# Patient Record
Sex: FEMALE | Race: WHITE | NOT HISPANIC OR LATINO | Employment: FULL TIME | URBAN - METROPOLITAN AREA
[De-identification: names, ages, dates, MRNs, and addresses within clinical notes are randomized per-mention and may not be internally consistent; named-entity substitution may affect disease eponyms.]

---

## 2024-03-23 ENCOUNTER — APPOINTMENT (EMERGENCY)
Dept: RADIOLOGY | Facility: HOSPITAL | Age: 59
End: 2024-03-23
Payer: COMMERCIAL

## 2024-03-23 ENCOUNTER — HOSPITAL ENCOUNTER (EMERGENCY)
Facility: HOSPITAL | Age: 59
Discharge: HOME | End: 2024-03-23
Attending: FAMILY MEDICINE
Payer: COMMERCIAL

## 2024-03-23 VITALS
DIASTOLIC BLOOD PRESSURE: 58 MMHG | HEART RATE: 75 BPM | RESPIRATION RATE: 18 BRPM | OXYGEN SATURATION: 92 % | WEIGHT: 208 LBS | TEMPERATURE: 98.1 F | SYSTOLIC BLOOD PRESSURE: 123 MMHG

## 2024-03-23 DIAGNOSIS — J10.1 INFLUENZA B: ICD-10-CM

## 2024-03-23 DIAGNOSIS — R05.9 COUGH: ICD-10-CM

## 2024-03-23 DIAGNOSIS — J18.9 PNEUMONIA: Primary | ICD-10-CM

## 2024-03-23 LAB
ANION GAP SERPL CALCULATED.3IONS-SCNC: 11 MMOL/L (ref 4–13)
BASOPHILS # BLD AUTO: 0.01 THOUSANDS/ÂΜL (ref 0–0.1)
BASOPHILS NFR BLD AUTO: 0 % (ref 0–1)
BNP SERPL-MCNC: 64 PG/ML (ref 0–100)
BUN SERPL-MCNC: 14 MG/DL (ref 5–25)
CALCIUM SERPL-MCNC: 10.1 MG/DL (ref 8.4–10.2)
CHLORIDE SERPL-SCNC: 98 MMOL/L (ref 96–108)
CO2 SERPL-SCNC: 24 MMOL/L (ref 21–32)
CREAT SERPL-MCNC: 0.94 MG/DL (ref 0.6–1.3)
EOSINOPHIL # BLD AUTO: 0 THOUSAND/ÂΜL (ref 0–0.61)
EOSINOPHIL NFR BLD AUTO: 0 % (ref 0–6)
ERYTHROCYTE [DISTWIDTH] IN BLOOD BY AUTOMATED COUNT: 12.1 % (ref 11.6–15.1)
FLUAV RNA RESP QL NAA+PROBE: NEGATIVE
FLUBV RNA RESP QL NAA+PROBE: POSITIVE
GFR SERPL CREATININE-BSD FRML MDRD: 65 ML/MIN/1.73SQ M
GLUCOSE SERPL-MCNC: 131 MG/DL (ref 65–140)
HCT VFR BLD AUTO: 42.6 % (ref 34.8–46.1)
HGB BLD-MCNC: 14 G/DL (ref 11.5–15.4)
IMM GRANULOCYTES # BLD AUTO: 0.02 THOUSAND/UL (ref 0–0.2)
IMM GRANULOCYTES NFR BLD AUTO: 0 % (ref 0–2)
LYMPHOCYTES # BLD AUTO: 1.16 THOUSANDS/ÂΜL (ref 0.6–4.47)
LYMPHOCYTES NFR BLD AUTO: 12 % (ref 14–44)
MCH RBC QN AUTO: 30.3 PG (ref 26.8–34.3)
MCHC RBC AUTO-ENTMCNC: 32.9 G/DL (ref 31.4–37.4)
MCV RBC AUTO: 92 FL (ref 82–98)
MONOCYTES # BLD AUTO: 0.54 THOUSAND/ÂΜL (ref 0.17–1.22)
MONOCYTES NFR BLD AUTO: 6 % (ref 4–12)
NEUTROPHILS # BLD AUTO: 7.91 THOUSANDS/ÂΜL (ref 1.85–7.62)
NEUTS SEG NFR BLD AUTO: 82 % (ref 43–75)
NRBC BLD AUTO-RTO: 0 /100 WBCS
PLATELET # BLD AUTO: 268 THOUSANDS/UL (ref 149–390)
PMV BLD AUTO: 10 FL (ref 8.9–12.7)
POTASSIUM SERPL-SCNC: 3.9 MMOL/L (ref 3.5–5.3)
RBC # BLD AUTO: 4.62 MILLION/UL (ref 3.81–5.12)
RSV RNA RESP QL NAA+PROBE: NEGATIVE
SARS-COV-2 RNA RESP QL NAA+PROBE: NEGATIVE
SODIUM SERPL-SCNC: 133 MMOL/L (ref 135–147)
WBC # BLD AUTO: 9.64 THOUSAND/UL (ref 4.31–10.16)

## 2024-03-23 PROCEDURE — 71045 X-RAY EXAM CHEST 1 VIEW: CPT

## 2024-03-23 PROCEDURE — 96375 TX/PRO/DX INJ NEW DRUG ADDON: CPT

## 2024-03-23 PROCEDURE — 36415 COLL VENOUS BLD VENIPUNCTURE: CPT | Performed by: FAMILY MEDICINE

## 2024-03-23 PROCEDURE — 99283 EMERGENCY DEPT VISIT LOW MDM: CPT

## 2024-03-23 PROCEDURE — 94640 AIRWAY INHALATION TREATMENT: CPT

## 2024-03-23 PROCEDURE — 99285 EMERGENCY DEPT VISIT HI MDM: CPT | Performed by: FAMILY MEDICINE

## 2024-03-23 PROCEDURE — 85025 COMPLETE CBC W/AUTO DIFF WBC: CPT | Performed by: FAMILY MEDICINE

## 2024-03-23 PROCEDURE — 83880 ASSAY OF NATRIURETIC PEPTIDE: CPT | Performed by: FAMILY MEDICINE

## 2024-03-23 PROCEDURE — 96365 THER/PROPH/DIAG IV INF INIT: CPT

## 2024-03-23 PROCEDURE — 80048 BASIC METABOLIC PNL TOTAL CA: CPT | Performed by: FAMILY MEDICINE

## 2024-03-23 PROCEDURE — 0241U HB NFCT DS VIR RESP RNA 4 TRGT: CPT | Performed by: FAMILY MEDICINE

## 2024-03-23 RX ORDER — CEFTRIAXONE 1 G/50ML
1000 INJECTION, SOLUTION INTRAVENOUS ONCE
Status: COMPLETED | OUTPATIENT
Start: 2024-03-23 | End: 2024-03-23

## 2024-03-23 RX ORDER — BENZONATATE 100 MG/1
100 CAPSULE ORAL EVERY 8 HOURS
Qty: 21 CAPSULE | Refills: 0 | Status: SHIPPED | OUTPATIENT
Start: 2024-03-23

## 2024-03-23 RX ORDER — METHYLPREDNISOLONE 4 MG/1
TABLET ORAL
Qty: 21 TABLET | Refills: 0 | Status: SHIPPED | OUTPATIENT
Start: 2024-03-23

## 2024-03-23 RX ORDER — AZITHROMYCIN 250 MG/1
TABLET, FILM COATED ORAL
Qty: 6 TABLET | Refills: 0 | Status: SHIPPED | OUTPATIENT
Start: 2024-03-23 | End: 2024-03-27

## 2024-03-23 RX ORDER — ALBUTEROL SULFATE 2.5 MG/3ML
2.5 SOLUTION RESPIRATORY (INHALATION) ONCE
Status: COMPLETED | OUTPATIENT
Start: 2024-03-23 | End: 2024-03-23

## 2024-03-23 RX ORDER — METHYLPREDNISOLONE SODIUM SUCCINATE 125 MG/2ML
125 INJECTION, POWDER, LYOPHILIZED, FOR SOLUTION INTRAMUSCULAR; INTRAVENOUS ONCE
Status: COMPLETED | OUTPATIENT
Start: 2024-03-23 | End: 2024-03-23

## 2024-03-23 RX ADMIN — METHYLPREDNISOLONE SODIUM SUCCINATE 125 MG: 125 INJECTION, POWDER, FOR SOLUTION INTRAMUSCULAR; INTRAVENOUS at 08:28

## 2024-03-23 RX ADMIN — ALBUTEROL SULFATE 2.5 MG: 2.5 SOLUTION RESPIRATORY (INHALATION) at 08:29

## 2024-03-23 RX ADMIN — SODIUM CHLORIDE 1000 ML: 0.9 INJECTION, SOLUTION INTRAVENOUS at 08:20

## 2024-03-23 RX ADMIN — CEFTRIAXONE 1000 MG: 1 INJECTION, SOLUTION INTRAVENOUS at 08:28

## 2024-03-23 NOTE — Clinical Note
Radha Guillen was seen and treated in our emergency department on 3/23/2024.                Diagnosis:     Radha  may return to work on return date.    She may return on this date: 03/26/2024         If you have any questions or concerns, please don't hesitate to call.      Bjorn Rubalcava MD    ______________________________           _______________          _______________  Hospital Representative                              Date                                Time

## 2024-03-23 NOTE — ED PROVIDER NOTES
History  Chief Complaint   Patient presents with    Cough     HPI  This is 59-year-old female with a history of hypertension presented with complaint of productive cough that started on Tuesday.  Patient denies any fever.  States had a COVID negative test at home.  Patient states that they are here visiting and was having some shortness of breath yesterday which prompted this ED visit.  Patient oxygen saturation is 96 to 97% on room air.  Heart rate is 80.  Patient is nontoxic-appearing afebrile sitting comfortably in bed no conversation dyspnea noted.  Otherwise stable.  Patient denying any chest pain abdominal pain nausea vomiting or diarrhea.  Eating drinking at baseline.  Prior to Admission Medications   Prescriptions Last Dose Informant Patient Reported? Taking?   olmesartan 20 mg TABS 20 mg, hydroCHLOROthiazide 12.5 mg TABS 12.5 mg   Yes No   Sig: Take by mouth daily      Facility-Administered Medications: None       Past Medical History:   Diagnosis Date    Hypertension        History reviewed. No pertinent surgical history.    History reviewed. No pertinent family history.  I have reviewed and agree with the history as documented.    E-Cigarette/Vaping    E-Cigarette Use Never User      E-Cigarette/Vaping Substances     Social History     Tobacco Use    Smoking status: Never    Smokeless tobacco: Never   Vaping Use    Vaping status: Never Used   Substance Use Topics    Drug use: Never       Review of Systems   Constitutional:  Negative for chills and fever.   HENT:  Positive for congestion and sore throat. Negative for rhinorrhea.    Eyes:  Negative for visual disturbance.   Respiratory:  Positive for cough and shortness of breath.    Cardiovascular:  Negative for chest pain and leg swelling.   Gastrointestinal:  Negative for abdominal pain, diarrhea, nausea and vomiting.   Genitourinary:  Negative for dysuria.   Musculoskeletal:  Negative for back pain and myalgias.   Skin:  Negative for rash.    Neurological:  Negative for dizziness and headaches.   Psychiatric/Behavioral:  Negative for confusion.    All other systems reviewed and are negative.      Physical Exam  Physical Exam  Vitals and nursing note reviewed.   Constitutional:       Appearance: She is well-developed.   HENT:      Head: Normocephalic and atraumatic.      Right Ear: External ear normal.      Left Ear: External ear normal.      Nose: Nose normal.      Mouth/Throat:      Mouth: Mucous membranes are moist.      Pharynx: No oropharyngeal exudate.   Eyes:      General: No scleral icterus.        Right eye: No discharge.         Left eye: No discharge.      Conjunctiva/sclera: Conjunctivae normal.      Pupils: Pupils are equal, round, and reactive to light.   Cardiovascular:      Rate and Rhythm: Normal rate and regular rhythm.      Pulses: Normal pulses.      Heart sounds: Normal heart sounds.   Pulmonary:      Effort: Pulmonary effort is normal. No respiratory distress.      Breath sounds: Rhonchi present. No wheezing.   Abdominal:      General: Bowel sounds are normal.      Palpations: Abdomen is soft.   Musculoskeletal:         General: Normal range of motion.      Cervical back: Normal range of motion and neck supple.   Lymphadenopathy:      Cervical: No cervical adenopathy.   Skin:     General: Skin is warm and dry.      Capillary Refill: Capillary refill takes less than 2 seconds.   Neurological:      General: No focal deficit present.      Mental Status: She is alert and oriented to person, place, and time.   Psychiatric:         Mood and Affect: Mood normal.         Behavior: Behavior normal.         Vital Signs  ED Triage Vitals   Temperature Pulse Respirations Blood Pressure SpO2   03/23/24 0805 03/23/24 0807 03/23/24 0807 03/23/24 0807 03/23/24 0807   98.1 °F (36.7 °C) 80 18 138/66 96 %      Temp Source Heart Rate Source Patient Position - Orthostatic VS BP Location FiO2 (%)   03/23/24 0805 03/23/24 0807 03/23/24 0815 03/23/24 0815  --   Temporal Monitor Sitting Left arm       Pain Score       03/23/24 0807       8           Vitals:    03/23/24 0807 03/23/24 0815 03/23/24 0830 03/23/24 0900   BP: 138/66 132/71 111/57 123/58   Pulse: 80 75 75 75   Patient Position - Orthostatic VS:  Sitting Sitting Sitting         Visual Acuity      ED Medications  Medications   azithromycin (ZITHROMAX) 500 mg in sodium chloride 0.9 % 250 mL IVPB (500 mg Intravenous Not Given 3/23/24 0900)   sodium chloride 0.9 % bolus 1,000 mL (0 mL Intravenous Stopped 3/23/24 0901)   methylPREDNISolone sodium succinate (Solu-MEDROL) injection 125 mg (125 mg Intravenous Given 3/23/24 0828)   albuterol inhalation solution 2.5 mg (2.5 mg Nebulization Given 3/23/24 0829)   cefTRIAXone (ROCEPHIN) IVPB (premix in dextrose) 1,000 mg 50 mL (0 mg Intravenous Stopped 3/23/24 0858)       Diagnostic Studies  Results Reviewed       Procedure Component Value Units Date/Time    FLU/RSV/COVID - if FLU/RSV clinically relevant [[id number]]  (Abnormal) Collected: 03/23/24 0817    Lab Status: Final result Specimen: Nares from Nose Updated: 03/23/24 0908     SARS-CoV-2 Negative     INFLUENZA A PCR Negative     INFLUENZA B PCR Positive     RSV PCR Negative    Narrative:      FOR PEDIATRIC PATIENTS - copy/paste COVID Guidelines URL to browser: [email or web address]    SARS-CoV-2 assay is a Nucleic Acid Amplification assay intended for the  qualitative detection of nucleic acid from SARS-CoV-2 in nasopharyngeal  swabs. Results are for the presumptive identification of SARS-CoV-2 RNA.    Positive results are indicative of infection with SARS-CoV-2, the virus  causing COVID-19, but do not rule out bacterial infection or co-infection  with other viruses. Laboratories within the [place] and its  territories are required to report all positive results to the appropriate  public health authorities. Negative results do not preclude  SARS-CoV-2  infection and should not be used as the sole basis for treatment or other  patient management decisions. Negative results must be combined with  clinical observations, patient history, and epidemiological information.  This test has not been FDA cleared or approved.    This test has been authorized by FDA under an Emergency Use Authorization  (EUA). This test is only authorized for the duration of time the  declaration that circumstances exist justifying the authorization of the  emergency use of an in vitro diagnostic tests for detection of SARS-CoV-2  virus and/or diagnosis of COVID-19 infection under section 564(b)(1) of  the Act, 21 U.S.C. 360bbb-3(b)(1), unless the authorization is terminated  or revoked sooner. The test has been validated but independent review by FDA  and CLIA is pending.    Test performed using Cepheid GeneXpert: This RT-PCR assay targets N2,  a region unique to SARS-CoV-2. A conserved region in the E-gene was chosen  for pan-Sarbecovirus detection which includes SARS-CoV-2.    According to CMS-2020-01-R, this platform meets the definition of high-throughput technology.    B-Type Natriuretic Peptide(BNP) [[id number]]  (Normal) Collected: 03/23/24 0817    Lab Status: Final result Specimen: Blood from Arm, Right Updated: 03/23/24 0849     BNP 64 pg/mL     Basic metabolic panel [[id number]]  (Abnormal) Collected: 03/23/24 0817    Lab Status: Final result Specimen: Blood from Arm, Right Updated: 03/23/24 0843     Sodium 133 mmol/L      Potassium 3.9 mmol/L      Chloride 98 mmol/L      CO2 24 mmol/L      ANION GAP 11 mmol/L      BUN 14 mg/dL      Creatinine 0.94 mg/dL      Glucose 131 mg/dL      Calcium 10.1 mg/dL      eGFR 65 ml/min/1.73sq m     Narrative:      National Kidney Disease Foundation guidelines for Chronic Kidney Disease (CKD):     Stage 1 with normal or high GFR (GFR > 90 mL/min/1.73 square meters)    Stage 2 Mild CKD (GFR = 60-89 mL/min/1.73 square meters)    Stage 3A  Moderate CKD (GFR = 45-59 mL/min/1.73 square meters)    Stage 3B Moderate CKD (GFR = 30-44 mL/min/1.73 square meters)    Stage 4 Severe CKD (GFR = 15-29 mL/min/1.73 square meters)    Stage 5 End Stage CKD (GFR <15 mL/min/1.73 square meters)  Note: GFR calculation is accurate only with a steady state creatinine    CBC and differential [[id number]]  (Abnormal) Collected: 03/23/24 0817    Lab Status: Final result Specimen: Blood from Arm, Right Updated: 03/23/24 0828     WBC 9.64 Thousand/uL      RBC 4.62 Million/uL      Hemoglobin 14.0 g/dL      Hematocrit 42.6 %      MCV 92 fL      MCH 30.3 pg      MCHC 32.9 g/dL      RDW 12.1 %      MPV 10.0 fL      Platelets 268 Thousands/uL      nRBC 0 /100 WBCs      Neutrophils Relative 82 %      Immature Grans % 0 %      Lymphocytes Relative 12 %      Monocytes Relative 6 %      Eosinophils Relative 0 %      Basophils Relative 0 %      Neutrophils Absolute 7.91 Thousands/µL      Absolute Immature Grans 0.02 Thousand/uL      Absolute Lymphocytes 1.16 Thousands/µL      Absolute Monocytes 0.54 Thousand/µL      Eosinophils Absolute 0.00 Thousand/µL      Basophils Absolute 0.01 Thousands/µL                    XR chest 1 view portable   ED Interpretation by Bjorn Rubalcava MD (03/23 0852)   Infiltrate                  Procedures  Procedures         ED Course  ED Course as of 03/23/24 0911   Sat Mar 23, 2024   0844 Labs WNL                               SBIRT 22yo+      Flowsheet Row Most Recent Value   Initial Alcohol Screen: US AUDIT-C     1. How often do you have a drink containing alcohol? 0 Filed at: 03/23/2024 0807   2. How many drinks containing alcohol do you have on a typical day you are drinking?  0 Filed at: 03/23/2024 0807   3a. Male UNDER 65: How often do you have five or more drinks on one occasion? 0 Filed at: 03/23/2024 0807   3b. FEMALE Any Age, or MALE 65+: How often do you have 4 or more drinks on one occassion? 0 Filed at: 03/23/2024 0807   Audit-C Score 0 Filed at:  03/23/2024 0807   ROSITA: How many times in the past year have you...    Used an illegal drug or used a prescription medication for non-medical reasons? Never Filed at: 03/23/2024 0807                      Medical Decision Making  This is 59-year-old female with a history of hypertension presented with complaint of productive cough that started on Tuesday.  Patient denies any fever.  States had a COVID negative test at home.  Patient states that they are here visiting and was having some shortness of breath yesterday which prompted this ED visit.  Patient oxygen saturation is 96 to 97% on room air.  Heart rate is 80.  Patient is nontoxic-appearing afebrile sitting comfortably in bed no conversation dyspnea noted.  Otherwise stable.  History taken from patient and  who is at bedside.  Differential diagnoses include pneumonia/COVID/flu/RSV/viral syndrome/URI/bronchitis/PE  Plan will obtain labs CBC BMP BNP chest x-ray we will start patient on Solu-Medrol albuterol and antibiotics  Lab reviewed: Lab within normal limits.  Patient is positive for influenza B this was discussed with patient given patient current symptoms will continue with treatment   Patient was reassessed at bedside feeling much better.  Lungs are clear to auscultation at this time.  Chest x-ray reviewed.  To be a small infiltrate concerning for infection  This was discussed with patient I also told patient that I do not have the official report back at this time and will give her a call with report given patient current symptoms we will start her on antibiotics steroid recommending following with the PCP immediate return back to the ED if notice any worsening symptoms including worsening shortness of breath cough congestion chest pain patient verbalized understand the plan discharge home.      Amount and/or Complexity of Data Reviewed  Labs: ordered.  Radiology: ordered and independent interpretation performed.    Risk  Prescription drug  management.             Disposition  Final diagnoses:   Pneumonia   Cough   Influenza B     Time reflects when diagnosis was documented in both MDM as applicable and the Disposition within this note       Time User Action Codes Description Comment    3/23/2024  8:25 AM Bjorn Rubalcava [J18.9] Pneumonia     3/23/2024  9:02 AM Bjorn Rubalcava Add [R05.9] Cough     3/23/2024  9:11 AM Bjorn Rubalcava [J10.1] Influenza B           ED Disposition       ED Disposition   Discharge    Condition   Stable    Date/Time   Sat Mar 23, 2024 0804    Comment   Radha [place] discharge to home/self care.                   Follow-up Information       Follow up With Specialties Details Why Contact Info    pcp  Schedule an appointment as soon as possible for a visit in 2 days If symptoms worsen             Discharge Medication List as of 3/23/2024  9:01 AM        START taking these medications    Details   azithromycin (ZITHROMAX) 250 mg tablet Take 2 tablets today then 1 tablet daily x 4 days, Normal      benzonatate (TESSALON PERLES) 100 mg capsule Take 1 capsule (100 mg total) by mouth every 8 (eight) hours, Starting Sat 3/23/2024, Normal      methylPREDNISolone 4 MG tablet therapy pack Use as directed on package, Normal           CONTINUE these medications which have NOT CHANGED    Details   olmesartan 20 mg TABS 20 mg, hydroCHLOROthiazide 12.5 mg TABS 12.5 mg Take by mouth daily, Historical Med             No discharge procedures on file.    PDMP Review       None            ED Provider  Electronically Signed by             Bjorn Rubalcava MD  03/23/24 2030

## 2024-03-24 NOTE — RESULT ENCOUNTER NOTE
Verified name and . Informed of +flu result. Sx started <48 hours ago. Discussed risks vs benefits of Tamiflu. Will write for Tamiflu.    Patient reports this was the best hospital she has ever been to, staff was wonderful. Encouraged to fill out survey